# Patient Record
Sex: MALE | Race: WHITE | URBAN - METROPOLITAN AREA
[De-identification: names, ages, dates, MRNs, and addresses within clinical notes are randomized per-mention and may not be internally consistent; named-entity substitution may affect disease eponyms.]

---

## 2021-07-09 ENCOUNTER — TELEPHONE (OUTPATIENT)
Dept: FAMILY MEDICINE CLINIC | Age: 51
End: 2021-07-09

## 2021-07-09 NOTE — TELEPHONE ENCOUNTER
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

## 2021-07-09 NOTE — TELEPHONE ENCOUNTER
Called patient's wife to inform. Advised that since patient cancelled his new patient appointment an hour prior to his new patient appointment that he could not reschedule. The patient was in the background stating that he cancelled because his dad was sick and in the hospital. I advised that I could send another message back to Dr. Brandon Mora. Before I could finish my sentence, the patient's wife said that she was going to report Dr. Brandon Mora and that she doesn't even want him to see him now. Patient started to cuss in the background and the wife hung up.